# Patient Record
Sex: MALE | Race: WHITE | Employment: OTHER | URBAN - METROPOLITAN AREA
[De-identification: names, ages, dates, MRNs, and addresses within clinical notes are randomized per-mention and may not be internally consistent; named-entity substitution may affect disease eponyms.]

---

## 2021-02-08 ENCOUNTER — OFFICE VISIT (OUTPATIENT)
Dept: ORTHOPEDIC SURGERY | Age: 66
End: 2021-02-08
Payer: MEDICARE

## 2021-02-08 VITALS — BODY MASS INDEX: 28.63 KG/M2 | WEIGHT: 200 LBS | TEMPERATURE: 96.6 F | HEIGHT: 70 IN

## 2021-02-08 DIAGNOSIS — M17.11 PRIMARY OSTEOARTHRITIS OF RIGHT KNEE: ICD-10-CM

## 2021-02-08 DIAGNOSIS — M25.561 RIGHT KNEE PAIN, UNSPECIFIED CHRONICITY: Primary | ICD-10-CM

## 2021-02-08 PROCEDURE — 99203 OFFICE O/P NEW LOW 30 MIN: CPT | Performed by: ORTHOPAEDIC SURGERY

## 2021-02-08 RX ORDER — LIOTHYRONINE SODIUM 5 UG/1
5 TABLET ORAL DAILY
COMMUNITY
Start: 2020-11-16

## 2021-02-08 RX ORDER — VALSARTAN AND HYDROCHLOROTHIAZIDE 320; 12.5 MG/1; MG/1
TABLET, FILM COATED ORAL
COMMUNITY
Start: 2021-01-11

## 2021-02-08 NOTE — PROGRESS NOTES
Knee sleeve applied to right knee and instruction on use and care were reviewed with the patient.     Brace supplied by and billed for by [facility]

## 2021-02-08 NOTE — PROGRESS NOTES
Chief Complaint:   Chief Complaint   Patient presents with    Knee Pain     Pain in right knee x 1 week. Had a cramp in right leg, jumped out a bed, next morning right knee was paining him. Taking OTC Ibuprofen. No xrays. HPI   68-year-old man. Seen several years ago with left knee osteoarthritis. About 1 week ago he jumped out of bed had acute onset of anterior right knee pain had some swelling but has been improving. Taking 200 mg ibuprofen once a day. There is no problem list on file for this patient. Past Medical History:   Diagnosis Date    Diabetes mellitus ([facility] [address]     Hyperlipidemia     Hypertension     Thyroid disease        Past Surgical History:   Procedure Laterality Date    TONSILLECTOMY         Current Outpatient Medications   Medication Sig Dispense Refill    valsartan-hydrochlorothiazide (DIOVAN-HCT) 320-12.5 MG per tablet TAKE 1 TABLET BY MOUTH EVERY MORNING      liothyronine (CYTOMEL) 5 MCG tablet Take 5 mcg by mouth daily       atorvastatin (LIPITOR) 10 MG tablet Take 10 mg by mouth daily       metFORMIN (GLUCOPHAGE) 1000 MG tablet Take 1,000 mg by mouth 2 times daily       levothyroxine (SYNTHROID) 125 MCG tablet Take 125 mg by mouth daily       naproxen (NAPROSYN) 500 MG tablet Take 1 tablet by mouth 2 times daily (with meals) for 3 days. (Patient not taking: Reported on 2/8/2021) 20 tablet 0     No current facility-administered medications for this visit.         No Known Allergies    Social History     Socioeconomic History    Marital status:      Spouse name: None    Number of children: None    Years of education: None    Highest education level: None   Occupational History    None   Social Needs    Financial resource strain: None    Food insecurity     Worry: None     Inability: None    Transportation needs     Medical: None     Non-medical: None   Tobacco Use    Smoking status: Never Smoker    Smokeless tobacco: Never Used   Substance and Sexual Activity    Alcohol use: No    Drug use: No    Sexual activity: None   Lifestyle    Physical activity     Days per week: None     Minutes per session: None    Stress: None   Relationships    Social connections     Talks on phone: None     Gets together: None     Attends Protestant service: None     Active member of club or organization: None     Attends meetings of clubs or organizations: None     Relationship status: None    Intimate partner violence     Fear of current or ex partner: None     Emotionally abused: None     Physically abused: None     Forced sexual activity: None   Other Topics Concern    None   Social History Narrative    None       No family history on file. Review of Systems   No fever, chills, or other constitutionalsymptoms. No numbness or other neuro symptoms. No chest pain. No dyspnea. [unfilled]   Right knee exam demonstrates no effusion now. Full range of motion. Tender medial patellofemoral.  No tenderness at the medial femorotibial joint line. Full range of motion. Stable and not painful to varus valgus and Lachman testing. No pain with right hip rotation. Physical Exam    Patient is alert and oriented. Well-developed well-nourished. BMI 29. Pupils equal and reactive. Scleraeanicteric. Neck supple  Lungs clear. Cardiac rate and rhythm regular. Abdomen soft and nontender. Skin warm and dry. XRAY: Knee x-rays today bilateral standing AP, flexion weightbearing and lateral view of the right knee. The right knee shows moderate medial joint space narrowing on the flexion weightbearing view some joint space preserved. Patellofemoral degenerative narrowing sclerosis noted. Incidentally noted near stage IV medial compartment arthrosis left knee with varus. Impression: Radiographically moderate patellofemoral and medial osteoarthritis right knee. More advanced degenerative changes left knee.                     ASSESSMENT/PLAN:    Deacon Cardenas was seen today for knee pain. Diagnoses and all orders for this visit:    Right knee pain, unspecified chronicity  -     XR KNEE BILATERAL STANDING; Future  -     XR KNEE RIGHT (1-2 VIEWS); Future  -     NC KO ELASTIC WITH JOINTS    Primary osteoarthritis of right knee    Findings and images reviewed with the patient. Appropriate dosages of ibuprofen reviewed with meals GI precautions. Fitted with knee sleeve brace which feels more comfortable. He does have a stationary bike and appropriate exercise program reviewed with him. He declines consideration of steroid injection at this point. Return if symptoms worsen or fail to improve.        Luigi Padilla MD    2/8/2021  8:43 AM

## 2022-03-24 ENCOUNTER — OFFICE VISIT (OUTPATIENT)
Dept: ORTHOPEDIC SURGERY | Age: 67
End: 2022-03-24
Payer: MEDICARE

## 2022-03-24 VITALS — WEIGHT: 200 LBS | BODY MASS INDEX: 28.63 KG/M2 | HEIGHT: 70 IN

## 2022-03-24 DIAGNOSIS — M65.321 TRIGGER INDEX FINGER OF RIGHT HAND: Primary | ICD-10-CM

## 2022-03-24 DIAGNOSIS — M65.311 TRIGGER FINGER OF RIGHT THUMB: ICD-10-CM

## 2022-03-24 PROCEDURE — 4040F PNEUMOC VAC/ADMIN/RCVD: CPT | Performed by: ORTHOPAEDIC SURGERY

## 2022-03-24 PROCEDURE — 3017F COLORECTAL CA SCREEN DOC REV: CPT | Performed by: ORTHOPAEDIC SURGERY

## 2022-03-24 PROCEDURE — G8417 CALC BMI ABV UP PARAM F/U: HCPCS | Performed by: ORTHOPAEDIC SURGERY

## 2022-03-24 PROCEDURE — 99213 OFFICE O/P EST LOW 20 MIN: CPT | Performed by: ORTHOPAEDIC SURGERY

## 2022-03-24 PROCEDURE — 1036F TOBACCO NON-USER: CPT | Performed by: ORTHOPAEDIC SURGERY

## 2022-03-24 PROCEDURE — G8427 DOCREV CUR MEDS BY ELIG CLIN: HCPCS | Performed by: ORTHOPAEDIC SURGERY

## 2022-03-24 PROCEDURE — G8484 FLU IMMUNIZE NO ADMIN: HCPCS | Performed by: ORTHOPAEDIC SURGERY

## 2022-03-24 PROCEDURE — 20550 NJX 1 TENDON SHEATH/LIGAMENT: CPT | Performed by: ORTHOPAEDIC SURGERY

## 2022-03-24 PROCEDURE — 1123F ACP DISCUSS/DSCN MKR DOCD: CPT | Performed by: ORTHOPAEDIC SURGERY

## 2022-03-24 RX ORDER — TRIAMCINOLONE ACETONIDE 40 MG/ML
20 INJECTION, SUSPENSION INTRA-ARTICULAR; INTRAMUSCULAR ONCE
Status: COMPLETED | OUTPATIENT
Start: 2022-03-24 | End: 2022-03-24

## 2022-03-24 RX ADMIN — TRIAMCINOLONE ACETONIDE 20 MG: 40 INJECTION, SUSPENSION INTRA-ARTICULAR; INTRAMUSCULAR at 14:09

## 2022-03-24 NOTE — PROGRESS NOTES
Chief Complaint:   Chief Complaint   Patient presents with    Finger Pain     Right thumb and index trigger finger. Swollen and has a hard time gripping. HPI 59-year-old man with several weeks of pain and locking of the right index lesser symptoms of the right thumb. No focal injury but he is very active caring for animals and as a . Past medical history positive for diabetes. There is no problem list on file for this patient. Past Medical History:   Diagnosis Date    Diabetes mellitus ([facility] [address]     Hyperlipidemia     Hypertension     Thyroid disease        Past Surgical History:   Procedure Laterality Date    TONSILLECTOMY         Current Outpatient Medications   Medication Sig Dispense Refill    valsartan-hydrochlorothiazide (DIOVAN-HCT) 320-12.5 MG per tablet TAKE 1 TABLET BY MOUTH EVERY MORNING      liothyronine (CYTOMEL) 5 MCG tablet Take 5 mcg by mouth daily       atorvastatin (LIPITOR) 10 MG tablet Take 10 mg by mouth daily       metFORMIN (GLUCOPHAGE) 1000 MG tablet Take 1,000 mg by mouth 2 times daily       levothyroxine (SYNTHROID) 125 MCG tablet Take 125 mg by mouth daily       naproxen (NAPROSYN) 500 MG tablet Take 1 tablet by mouth 2 times daily (with meals) for 3 days.  20 tablet 0     Current Facility-Administered Medications   Medication Dose Route Frequency Provider Last Rate Last Admin    triamcinolone acetonide (KENALOG-40) injection 20 mg  20 mg Intra-artICUlar Once Amy Morgan MD           No Known Allergies    Social History     Socioeconomic History    Marital status:      Spouse name: None    Number of children: None    Years of education: None    Highest education level: None   Occupational History    None   Tobacco Use    Smoking status: Never Smoker    Smokeless tobacco: Never Used   Substance and Sexual Activity    Alcohol use: No    Drug use: No    Sexual activity: None   Other Topics Concern    None   Social History Narrative    None     Social Determinants of Health     Financial Resource Strain:     Difficulty of Paying Living Expenses: Not on file   Food Insecurity:     Worried About Running Out of Food in the Last Year: Not on file    Phuc of Food in the Last Year: Not on file   Transportation Needs:     Lack of Transportation (Medical): Not on file    Lack of Transportation (Non-Medical): Not on file   Physical Activity:     Days of Exercise per Week: Not on file    Minutes of Exercise per Session: Not on file   Stress:     Feeling of Stress : Not on file   Social Connections:     Frequency of Communication with Friends and Family: Not on file    Frequency of Social Gatherings with Friends and Family: Not on file    Attends Buddhism Services: Not on file    Active Member of [address] [place] [address] [employer] or Organizations: Not on file    Attends Club or Organization Meetings: Not on file    Marital Status: Not on file   Intimate Partner Violence:     Fear of Current or Ex-Partner: Not on file    Emotionally Abused: Not on file    Physically Abused: Not on file    Sexually Abused: Not on file   Housing Stability:     Unable to Pay for Housing in the Last Year: Not on file    Number of [place] in the Last Year: Not on file    Unstable Housing in the Last Year: Not on file       History reviewed. No pertinent family history. Review of Systems   No fever, chills, or other constitutionalsymptoms. No numbness or other neuro symptoms. No chest pain. No dyspnea. [unfilled]   Left hand exam demonstrates minimal chronic appearing edema of the index finger. Palpable pain at primarily the A1 flexor level with palpable locking with flexion and extension but range of motion is full. Much less pronounced findings at the thumb. Physical Exam    Patient is alert and oriented. Well-developed well-nourished. Pupils equal and reactive. Scleraeanicteric. Neck supple  Lungs clear.   Cardiac rate and rhythm regular. Abdomen soft and nontender. Skin warm and dry. XRAY: Deferred                    ASSESSMENT/PLAN:    Raúl Meade was seen today for finger pain. Diagnoses and all orders for this visit:    Trigger index finger of right hand  -     CO INJECT TENDON SHEATH/LIGAMENT    Trigger finger of right thumb    Other orders  -     triamcinolone acetonide (KENALOG-40) injection 20 mg    Principal finding was the index finger. We will inject this finger and observe the thumb. Discussed surgical option as well but wishes to proceed with injection. Under alcohol prep, right index finger flexor tendon sheath was injected at the A1 flexor pulley level with Kenalog without difficulty and well-tolerated by the patient. If does not satisfactorily improve consider surgical trigger finger release. Return if symptoms worsen or fail to improve.        Keturah Lopez MD    3/24/2022  1:16 PM